# Patient Record
Sex: FEMALE | Race: BLACK OR AFRICAN AMERICAN | NOT HISPANIC OR LATINO
[De-identification: names, ages, dates, MRNs, and addresses within clinical notes are randomized per-mention and may not be internally consistent; named-entity substitution may affect disease eponyms.]

---

## 2024-03-22 ENCOUNTER — APPOINTMENT (OUTPATIENT)
Dept: GASTROENTEROLOGY | Facility: CLINIC | Age: 69
End: 2024-03-22
Payer: MEDICAID

## 2024-03-22 VITALS
BODY MASS INDEX: 24.59 KG/M2 | HEART RATE: 68 BPM | TEMPERATURE: 96.8 F | HEIGHT: 66 IN | OXYGEN SATURATION: 99 % | SYSTOLIC BLOOD PRESSURE: 126 MMHG | DIASTOLIC BLOOD PRESSURE: 84 MMHG | WEIGHT: 153 LBS

## 2024-03-22 DIAGNOSIS — Z78.9 OTHER SPECIFIED HEALTH STATUS: ICD-10-CM

## 2024-03-22 DIAGNOSIS — R14.2 ERUCTATION: ICD-10-CM

## 2024-03-22 DIAGNOSIS — E11.9 TYPE 2 DIABETES MELLITUS W/OUT COMPLICATIONS: ICD-10-CM

## 2024-03-22 DIAGNOSIS — R10.13 EPIGASTRIC PAIN: ICD-10-CM

## 2024-03-22 DIAGNOSIS — I10 ESSENTIAL (PRIMARY) HYPERTENSION: ICD-10-CM

## 2024-03-22 DIAGNOSIS — E78.5 HYPERLIPIDEMIA, UNSPECIFIED: ICD-10-CM

## 2024-03-22 PROBLEM — Z00.00 ENCOUNTER FOR PREVENTIVE HEALTH EXAMINATION: Status: ACTIVE | Noted: 2024-03-22

## 2024-03-22 PROCEDURE — 99203 OFFICE O/P NEW LOW 30 MIN: CPT

## 2024-03-22 RX ORDER — LOSARTAN POTASSIUM 100 MG/1
TABLET, FILM COATED ORAL
Refills: 0 | Status: ACTIVE | COMMUNITY

## 2024-03-22 RX ORDER — METFORMIN HYDROCHLORIDE 625 MG/1
TABLET ORAL
Refills: 0 | Status: ACTIVE | COMMUNITY

## 2024-03-22 RX ORDER — SIMVASTATIN 40 MG/1
TABLET, FILM COATED ORAL
Refills: 0 | Status: ACTIVE | COMMUNITY

## 2024-03-22 NOTE — ASSESSMENT
[FreeTextEntry1] : The patient is a 69-year-old with a history of hypertension, type 2 diabetes and hyperlipidemia.  Carol had complained of a vague epigastric discomfort which seems to be improved with dietary modification.  The patient does not abuse caffeine or ethanol.  The patient may have had transient gastritis.  Since the patient is a diabetic, I decided to send her for an abdominal sonogram to rule out atypical biliary issues.  Especially since there is mention of some bile duct surgery in the past.  The patient is at the age where she should have a colonoscopy however, she does not appear to be receptive.  The patient's sister inquired about Cologuard and I feel that this is an appropriate alternative.  The test will be ordered for the patient.  If the gastric symptoms recur, she does need an upper endoscopy to assure that there is no significant inflammation and rule out H. pylori.  The case was discussed in detail with the patient's sister and all questions were answered.

## 2024-03-22 NOTE — REVIEW OF SYSTEMS
[Chills] : no chills [Fever] : no fever [Feeling Tired] : not feeling tired [Chest Pain] : no chest pain [Recent Weight Loss (___ Lbs)] : no recent weight loss [Palpitations] : no palpitations [As Noted in HPI] : as noted in HPI [Constipation] : no constipation [SOB on Exertion] : no shortness of breath during exertion [Diarrhea] : no diarrhea [FreeTextEntry7] : Increased belching and mild dyspepsia

## 2024-03-22 NOTE — HISTORY OF PRESENT ILLNESS
[FreeTextEntry1] : I saw patient Carol Freedman in the office today.  The patient is a 69-year-old female who has a history of type 2 diabetes, hypertension and hyperlipidemia.  The patient was accompanied by her sister who served as a .  There is no history of coronary artery disease.  Carol's appetite is generally good with no dysphagia.  The patient complains of a mild increase in belching with rare epigastric discomfort.  There is no true abdominal pain and there are no nocturnal symptoms.  The patient's sister states that her diet was quite poor with most recently with increasing her vegetable intake she has voluntarily lost some weight and her dyspeptic symptoms have improved.  The patient's bowel movements are generally normal with no blood in the stool or on the toilet tissue.  Carol has never had a colonoscopy.  The patient is not taking any aspirin products.  The patient has no significant caffeine, she does not drink ethanol, nor does she smoke.  There is no family history of gastrointestinal cancer.  There is a mention of some bile duct surgery in the past.